# Patient Record
Sex: FEMALE | Race: WHITE
[De-identification: names, ages, dates, MRNs, and addresses within clinical notes are randomized per-mention and may not be internally consistent; named-entity substitution may affect disease eponyms.]

---

## 2020-08-29 ENCOUNTER — HOSPITAL ENCOUNTER (OUTPATIENT)
Age: 60
End: 2020-08-29
Payer: COMMERCIAL

## 2020-08-29 DIAGNOSIS — N30.00: Primary | ICD-10-CM

## 2020-08-29 PROCEDURE — 87086 URINE CULTURE/COLONY COUNT: CPT

## 2021-03-11 ENCOUNTER — HOSPITAL ENCOUNTER (OUTPATIENT)
Age: 61
End: 2021-03-11
Payer: COMMERCIAL

## 2021-03-11 DIAGNOSIS — E87.5: Primary | ICD-10-CM

## 2021-03-11 LAB — POTASSIUM: 3.8 MMOL/L (ref 3.5–5.1)

## 2021-03-11 PROCEDURE — 36415 COLL VENOUS BLD VENIPUNCTURE: CPT

## 2021-03-11 PROCEDURE — 84132 ASSAY OF SERUM POTASSIUM: CPT

## 2021-09-02 ENCOUNTER — OFFICE VISIT (OUTPATIENT)
Dept: OBSTETRICS AND GYNECOLOGY | Facility: CLINIC | Age: 61
End: 2021-09-02

## 2021-09-02 VITALS
WEIGHT: 146 LBS | BODY MASS INDEX: 23.46 KG/M2 | HEIGHT: 66 IN | SYSTOLIC BLOOD PRESSURE: 122 MMHG | DIASTOLIC BLOOD PRESSURE: 75 MMHG

## 2021-09-02 DIAGNOSIS — Z01.419 ENCOUNTER FOR ANNUAL ROUTINE GYNECOLOGICAL EXAMINATION: Primary | ICD-10-CM

## 2021-09-02 DIAGNOSIS — N81.4 CYSTOCELE WITH UTERINE PROLAPSE: ICD-10-CM

## 2021-09-02 PROCEDURE — 99386 PREV VISIT NEW AGE 40-64: CPT | Performed by: OBSTETRICS & GYNECOLOGY

## 2021-09-02 RX ORDER — LISINOPRIL AND HYDROCHLOROTHIAZIDE 12.5; 1 MG/1; MG/1
1 TABLET ORAL DAILY
COMMUNITY
Start: 2021-06-22

## 2021-09-02 RX ORDER — ATORVASTATIN CALCIUM 40 MG/1
40 TABLET, FILM COATED ORAL DAILY
COMMUNITY
Start: 2021-07-27

## 2021-09-02 NOTE — PROGRESS NOTES
GYN Annual Exam     CC - Here for annual exam.        HPI  Mandie Sheldon is a 60 y.o. female, , who presents for annual well woman exam.  She is postmenopausal.  Patient denies vaginal bleeding. ..  Patient reports problems with: hot flashes and prolapse bladder . There were no changes to her medical or surgical history since her last visit.. Partner Status: Marital Status: .  New Partners since last visit: no.      Patient states she has a bladder prolapse. Patient states the prolapse has been present since 2016. She states its only getting worse. Patient states she feels a if something is coming out. Urination is hard sometimes.       Additional OB/GYN History   Current contraception: contraceptive methods: Post menopausal status  Desires to: do not start contraception  On HRT? No  Last Pap :   Last Completed Pap Smear     This patient has no relevant Health Maintenance data.        History of abnormal Pap smear: no  Family history of uterine, colon, breast, or ovarian cancer: no  Performs monthly Self-Breast Exam: yes  Last mammogram:  2016  Last Completed Mammogram     This patient has no relevant Health Maintenance data.        Last colonoscopy: Never  Last Completed Colonoscopy     This patient has no relevant Health Maintenance data.        Last DEXA: None  Exercises Regularly: no  Feelings of Anxiety or Depression: no      Tobacco Usage?: No   OB History        2    Para   2    Term   2            AB        Living   2       SAB        TAB        Ectopic        Molar        Multiple        Live Births   2                Health Maintenance   Topic Date Due   • Annual Gynecologic Pelvic and Breast Exam  Never done   • MAMMOGRAM  Never done   • COLORECTAL CANCER SCREENING  Never done   • ANNUAL PHYSICAL  Never done   • TDAP/TD VACCINES (1 - Tdap) Never done   • ZOSTER VACCINE (1 of 2) Never done   • HEPATITIS C SCREENING  Never done   • PAP SMEAR  Never done   • LIPID PANEL  Never  "done   • INFLUENZA VACCINE  10/01/2021   • COVID-19 Vaccine  Completed   • Pneumococcal Vaccine 0-64  Aged Out       The additional following portions of the patient's history were reviewed and updated as appropriate: allergies, current medications, past family history, past medical history, past social history, past surgical history and problem list.    Review of Systems   Constitutional: Negative.    HENT: Negative.    Eyes: Negative.    Respiratory: Negative.    Cardiovascular: Negative.    Gastrointestinal: Negative.    Endocrine: Negative.    Genitourinary: Negative.    Musculoskeletal: Negative.    Skin: Negative.    Allergic/Immunologic: Negative.    Neurological: Negative.    Hematological: Negative.    Psychiatric/Behavioral: Negative.        I have reviewed and agree with the HPI, ROS, and historical information as entered above. Angie Stevenson MD    Objective   /75   Ht 167.6 cm (66\")   Wt 66.2 kg (146 lb)   LMP  (LMP Unknown)   BMI 23.57 kg/m²     Physical Exam  Vitals and nursing note reviewed. Exam conducted with a chaperone present.   Constitutional:       Appearance: She is well-developed.   HENT:      Head: Normocephalic and atraumatic.   Neck:      Thyroid: No thyroid mass or thyromegaly.   Cardiovascular:      Rate and Rhythm: Normal rate and regular rhythm.      Heart sounds: No murmur heard.     Pulmonary:      Effort: Pulmonary effort is normal. No retractions.      Breath sounds: Normal breath sounds. No wheezing, rhonchi or rales.   Chest:      Chest wall: No mass or tenderness.      Breasts:         Right: Normal. No mass, nipple discharge, skin change or tenderness.         Left: Normal. No mass, nipple discharge, skin change or tenderness.   Abdominal:      General: Bowel sounds are normal.      Palpations: Abdomen is soft. Abdomen is not rigid. There is no mass.      Tenderness: There is no abdominal tenderness. There is no guarding.      Hernia: No hernia is present. There is " no hernia in the left inguinal area.   Genitourinary:     Labia:         Right: No rash, tenderness or lesion.         Left: No rash, tenderness or lesion.       Vagina: Prolapsed vaginal walls present. No vaginal discharge or lesions.      Cervix: No cervical motion tenderness, discharge, lesion or cervical bleeding.      Uterus: Normal. Not enlarged, not fixed and not tender.       Adnexa:         Right: No mass or tenderness.          Left: No mass or tenderness.        Rectum: No external hemorrhoid.      Comments: Has small prolapsed bladder  Musculoskeletal:      Cervical back: Normal range of motion. No muscular tenderness.   Neurological:      Mental Status: She is alert and oriented to person, place, and time.   Psychiatric:         Behavior: Behavior normal.            Assessment and Plan    Problem List Items Addressed This Visit     None      Visit Diagnoses     Encounter for annual routine gynecological examination    -  Primary    Relevant Orders    Pap IG, Rfx HPV ASCU    Cystocele with uterine prolapse              1. GYN annual well woman exam.   2. Reviewed monthly self breast exams.  Instructed to call with lumps, pain, or breast discharge.  Yearly mammograms ordered.  3. Recommended use of Vitamin D and getting adequate calcium in her diet. (1500mg)  4. Other: 5.  Ring pessary fitted and she seems to like it so we can order it for her  5. No follow-ups on file.     Angie Stevenson MD  09/02/2021

## 2021-09-07 DIAGNOSIS — N81.9 FEMALE GENITAL PROLAPSE, UNSPECIFIED TYPE: Primary | ICD-10-CM

## 2021-09-16 ENCOUNTER — TELEPHONE (OUTPATIENT)
Dept: OBSTETRICS AND GYNECOLOGY | Facility: CLINIC | Age: 61
End: 2021-09-16

## 2021-09-24 ENCOUNTER — OFFICE VISIT (OUTPATIENT)
Dept: OBSTETRICS AND GYNECOLOGY | Facility: CLINIC | Age: 61
End: 2021-09-24

## 2021-09-24 VITALS
BODY MASS INDEX: 23.95 KG/M2 | HEIGHT: 66 IN | SYSTOLIC BLOOD PRESSURE: 102 MMHG | WEIGHT: 149 LBS | DIASTOLIC BLOOD PRESSURE: 70 MMHG

## 2021-09-24 DIAGNOSIS — N81.4 CYSTOCELE WITH PROLAPSE: Primary | ICD-10-CM

## 2021-09-24 PROCEDURE — 99212 OFFICE O/P EST SF 10 MIN: CPT | Performed by: OBSTETRICS & GYNECOLOGY

## 2021-09-24 NOTE — PROGRESS NOTES
Chief Complaint   Patient presents with   • Follow-up     pessary placement       Subjective   HPI  Mandie Sheldon is a 60 y.o. female, , who presents for placement of pessary. Patient in office 2021 for pelvic organ prolapse.         Her last LMP was No LMP recorded (lmp unknown). Patient is postmenopausal..      Additional OB/GYN History   Current contraception: contraceptive methods: Post menopausal status  Desires to: do not start contraception  Last Pap :   Last Completed Pap Smear     This patient has no relevant Health Maintenance data.        History of abnormal Pap smear: no  Last mammogram:   Last Completed Mammogram     This patient has no relevant Health Maintenance data.        Tobacco Usage?: No   OB History        2    Para   2    Term   2            AB        Living   2       SAB        TAB        Ectopic        Molar        Multiple        Live Births   2                Health Maintenance   Topic Date Due   • Annual Gynecologic Pelvic and Breast Exam  Never done   • MAMMOGRAM  Never done   • COLORECTAL CANCER SCREENING  Never done   • ANNUAL PHYSICAL  Never done   • TDAP/TD VACCINES (1 - Tdap) Never done   • ZOSTER VACCINE (1 of 2) Never done   • HEPATITIS C SCREENING  Never done   • PAP SMEAR  Never done   • LIPID PANEL  Never done   • INFLUENZA VACCINE  10/01/2021   • COVID-19 Vaccine  Completed   • Pneumococcal Vaccine 0-64  Aged Out       The additional following portions of the patient's history were reviewed and updated as appropriate: allergies and current medications.    Review of Systems   Constitutional: Negative.    HENT: Negative.    Respiratory: Negative.    Cardiovascular: Negative.    Gastrointestinal: Negative.    Musculoskeletal: Negative.    Skin: Negative.    Allergic/Immunologic: Negative.    Neurological: Negative.    Hematological: Negative.    Psychiatric/Behavioral: Negative.        I have reviewed and agree with the HPI, ROS, and historical  "information as entered above. Angie Stevenson MD    Objective   /70   Ht 167.6 cm (66\")   Wt 67.6 kg (149 lb)   LMP  (LMP Unknown)   BMI 24.05 kg/m²     Physical Exam  Vitals and nursing note reviewed. Exam conducted with a chaperone present.   Genitourinary:     Labia:         Right: No rash, tenderness or lesion.         Left: No rash, tenderness or lesion.       Vagina: Normal. No lesions.      Cervix: No cervical motion tenderness, discharge, lesion or cervical bleeding.      Uterus: Normal. Not enlarged, not fixed and not tender.       Adnexa:         Right: No mass or tenderness.          Left: No mass or tenderness.        Rectum: No external hemorrhoid.      Comments: Chaperone Present        Assessment/Plan     Assessment     Problem List Items Addressed This Visit     None      Visit Diagnoses     Cystocele with prolapse    -  Primary          1. Patient is here for placement of the #5 ring pessary with support.    Plan     No follow-ups on file.  1. We will place the ring pessary without difficulty and patient seemed to have no problem with it.  We counseled her about using it and she can take it out herself Viri and have her come back in 6 weeks for check.      Angie Stevenson MD  09/24/2021    "

## 2021-11-09 ENCOUNTER — OFFICE VISIT (OUTPATIENT)
Dept: OBSTETRICS AND GYNECOLOGY | Facility: CLINIC | Age: 61
End: 2021-11-09

## 2021-11-09 ENCOUNTER — TELEPHONE (OUTPATIENT)
Dept: OBSTETRICS AND GYNECOLOGY | Facility: CLINIC | Age: 61
End: 2021-11-09

## 2021-11-09 VITALS
DIASTOLIC BLOOD PRESSURE: 74 MMHG | WEIGHT: 147 LBS | HEIGHT: 66 IN | SYSTOLIC BLOOD PRESSURE: 112 MMHG | BODY MASS INDEX: 23.63 KG/M2

## 2021-11-09 DIAGNOSIS — Z01.419 PAP TEST, AS PART OF ROUTINE GYNECOLOGICAL EXAMINATION: Primary | ICD-10-CM

## 2021-11-09 DIAGNOSIS — N81.4 CYSTOCELE WITH PROLAPSE: ICD-10-CM

## 2021-11-09 DIAGNOSIS — Z78.0 MENOPAUSE: ICD-10-CM

## 2021-11-09 PROCEDURE — 99396 PREV VISIT EST AGE 40-64: CPT | Performed by: OBSTETRICS & GYNECOLOGY

## 2021-11-09 RX ORDER — PROGESTERONE 100 MG/1
100 CAPSULE ORAL DAILY
Qty: 30 CAPSULE | Refills: 11 | Status: SHIPPED | OUTPATIENT
Start: 2021-11-09 | End: 2021-11-19

## 2021-11-09 RX ORDER — ESTRADIOL 0.05 MG/D
1 FILM, EXTENDED RELEASE TRANSDERMAL WEEKLY
Qty: 8 PATCH | Refills: 11 | Status: SHIPPED | OUTPATIENT
Start: 2021-11-09 | End: 2022-09-28

## 2021-11-09 NOTE — TELEPHONE ENCOUNTER
Pt's pharmacy called and is needing some clarification on the perscription for an estradiol patch.

## 2021-11-09 NOTE — TELEPHONE ENCOUNTER
S/w pharmacy and they stated the estradiol patch Dr. Stevenson sent in is for apply one patch twice weekly. Needed to okay this.

## 2021-11-09 NOTE — PROGRESS NOTES
GYN Annual Exam     CC - Here for annual exam.        HPI  Mandie Sheldon is a 60 y.o. female, , who presents for annual well woman exam.  She is postmenopausal.  Patient denies vaginal bleeding. ..  Patient reports problems with: prolapse-pessary in place-asking to have checked and cleaned today. states has removed on her own x1 since placement 2021. There were no changes to her medical or surgical history since her last visit.. Partner Status: Marital Status: .  New Partners since last visit: no. Patient here to follow up after pessary placement. Asking to have annual exam with pap today as well as it has been several years since her last gyn exam.     Additional OB/GYN History   Current contraception: contraceptive methods: Post menopausal status  Desires to: do not start contraception  On HRT? No  Last Pap :   Last Completed Pap Smear     This patient has no relevant Health Maintenance data.        History of abnormal Pap smear: no  Family history of uterine, colon, breast, or ovarian cancer: no  Performs monthly Self-Breast Exam: yes  Last mammogram: several years ago. Done at unsure.requests order for mammogram today.     Last Completed Mammogram     This patient has no relevant Health Maintenance data.        Last colonoscopy:   Last Completed Colonoscopy     This patient has no relevant Health Maintenance data.        Last DEXA: None  Exercises Regularly: yes  Feelings of Anxiety or Depression: no      Tobacco Usage?: No   OB History        2    Para   2    Term   2            AB        Living   2       SAB        IAB        Ectopic        Molar        Multiple        Live Births   2                Health Maintenance   Topic Date Due   • Annual Gynecologic Pelvic and Breast Exam  Never done   • COLORECTAL CANCER SCREENING  Never done   • ANNUAL PHYSICAL  Never done   • TDAP/TD VACCINES (1 - Tdap) Never done   • ZOSTER VACCINE (1 of 2) Never done   • INFLUENZA VACCINE  Never  "done   • HEPATITIS C SCREENING  Never done   • PAP SMEAR  Never done   • LIPID PANEL  Never done   • MAMMOGRAM  Never done   • COVID-19 Vaccine  Completed   • Pneumococcal Vaccine 0-64  Aged Out       The additional following portions of the patient's history were reviewed and updated as appropriate: allergies, current medications, past family history, past medical history, past social history and past surgical history.    Review of Systems   Constitutional: Negative.    HENT: Negative.    Eyes: Negative.    Respiratory: Negative.    Cardiovascular: Negative.    Gastrointestinal: Negative.    Endocrine: Negative.    Musculoskeletal: Negative.    Skin: Negative.    Allergic/Immunologic: Negative.    Neurological: Negative.    Hematological: Negative.    Psychiatric/Behavioral: Negative.        I have reviewed and agree with the HPI, ROS, and historical information as entered above. Angie Stevenson MD    Objective   /74   Ht 167.6 cm (66\")   Wt 66.7 kg (147 lb)   LMP  (LMP Unknown)   BMI 23.73 kg/m²     Physical Exam  Vitals and nursing note reviewed. Exam conducted with a chaperone present.   Constitutional:       Appearance: She is well-developed.   HENT:      Head: Normocephalic and atraumatic.   Neck:      Thyroid: No thyroid mass or thyromegaly.   Cardiovascular:      Rate and Rhythm: Normal rate and regular rhythm.      Heart sounds: No murmur heard.      Pulmonary:      Effort: Pulmonary effort is normal. No retractions.      Breath sounds: Normal breath sounds. No wheezing, rhonchi or rales.   Chest:      Chest wall: No mass or tenderness.   Breasts:      Right: Normal. No mass, nipple discharge, skin change or tenderness.      Left: Normal. No mass, nipple discharge, skin change or tenderness.       Abdominal:      General: Bowel sounds are normal.      Palpations: Abdomen is soft. Abdomen is not rigid. There is no mass.      Tenderness: There is no abdominal tenderness. There is no guarding.      " Hernia: No hernia is present. There is no hernia in the left inguinal area.   Genitourinary:     Labia:         Right: No rash, tenderness or lesion.         Left: No rash, tenderness or lesion.       Vagina: Prolapsed vaginal walls present. No vaginal discharge or lesions.      Cervix: No cervical motion tenderness, discharge, lesion or cervical bleeding.      Uterus: Normal. Not enlarged, not fixed and not tender.       Adnexa:         Right: No mass or tenderness.          Left: No mass or tenderness.        Rectum: No external hemorrhoid.      Comments: Pessary in place and remopved and cleaned and replaced  Musculoskeletal:      Cervical back: Normal range of motion. No muscular tenderness.   Neurological:      Mental Status: She is alert and oriented to person, place, and time.   Psychiatric:         Behavior: Behavior normal.            Assessment and Plan    Problem List Items Addressed This Visit     None      Visit Diagnoses     Pap test, as part of routine gynecological examination    -  Primary    Relevant Orders    Pap IG, Rfx HPV ASCU    Mammo Screening Digital Tomosynthesis Bilateral With CAD    Menopause        Relevant Medications    estradiol (MINIVELLE, VIVELLE-DOT) 0.05 MG/24HR patch    Progesterone (Prometrium) 100 MG capsule    Cystocele with prolapse          pessary in place     1. GYN annual well woman exam.   2. Reviewed monthly self breast exams.  Instructed to call with lumps, pain, or breast discharge.  Yearly mammograms ordered.  3. Ordered mammogram today.  4. Osteoporosis screening ordered today.  5. Reviewed exercise as a preventative health measures.   6. Reviewed BMI and weight loss as preventative health measures.   7. Colonoscopy recommended.  8. Reviewed risks of ERT including increased risk of breast cancer, increased blood clots, increased heart disease.  Patient strongly desires to stay on or start ERT.  She understands we will use the lowest amount that adequately controls  her symptoms.  9. RTC in 1 year or PRN with problems.  10. Other: pt wants to start on hormones  11. Return in about 2 months (around 1/9/2022).     Angie Stevenson MD  11/09/2021

## 2021-11-11 ENCOUNTER — TELEPHONE (OUTPATIENT)
Dept: OBSTETRICS AND GYNECOLOGY | Facility: CLINIC | Age: 61
End: 2021-11-11

## 2021-11-16 DIAGNOSIS — Z01.419 PAP TEST, AS PART OF ROUTINE GYNECOLOGICAL EXAMINATION: ICD-10-CM

## 2021-11-24 ENCOUNTER — HOSPITAL ENCOUNTER (OUTPATIENT)
Dept: MAMMOGRAPHY | Facility: HOSPITAL | Age: 61
Discharge: HOME | End: 2021-11-24

## 2021-11-24 ENCOUNTER — APPOINTMENT (OUTPATIENT)
Dept: OTHER | Facility: HOSPITAL | Age: 61
End: 2021-11-24

## 2021-11-24 DIAGNOSIS — Z01.419 PAP TEST, AS PART OF ROUTINE GYNECOLOGICAL EXAMINATION: ICD-10-CM

## 2022-01-13 ENCOUNTER — OFFICE VISIT (OUTPATIENT)
Dept: OBSTETRICS AND GYNECOLOGY | Facility: CLINIC | Age: 62
End: 2022-01-13

## 2022-01-13 VITALS
HEIGHT: 66 IN | BODY MASS INDEX: 25.23 KG/M2 | SYSTOLIC BLOOD PRESSURE: 118 MMHG | WEIGHT: 157 LBS | DIASTOLIC BLOOD PRESSURE: 80 MMHG

## 2022-01-13 DIAGNOSIS — Z78.0 MENOPAUSE: Primary | ICD-10-CM

## 2022-01-13 PROCEDURE — 99212 OFFICE O/P EST SF 10 MIN: CPT | Performed by: OBSTETRICS & GYNECOLOGY

## 2022-01-13 RX ORDER — PROGESTERONE 100 MG/1
100 CAPSULE ORAL DAILY
Qty: 30 CAPSULE | Refills: 11
Start: 2022-01-13 | End: 2022-09-28

## 2022-01-13 NOTE — PROGRESS NOTES
Chief Complaint   Patient presents with   • Follow-up     f/u since starting HRT       Subjective   HPI  Mandie Sheldon is a 61 y.o. female, , who presents for follow up after starting estrogen patch and progesterone-states feeling well-less fatigue.        Her last LMP was No LMP recorded (lmp unknown). Patient is postmenopausal..      Additional OB/GYN History   Current contraception: contraceptive methods: Post menopausal status  Desires to: do not start contraception  Last Pap :   Last Completed Pap Smear          Ordered - PAP SMEAR (Yearly) Ordered on 2021  SCANNED - PAP SMEAR              History of abnormal Pap smear: no  Last mammogram:   Last Completed Mammogram     This patient has no relevant Health Maintenance data.        Tobacco Usage?: No   OB History        2    Para   2    Term   2            AB        Living   2       SAB        IAB        Ectopic        Molar        Multiple        Live Births   2                Health Maintenance   Topic Date Due   • COLORECTAL CANCER SCREENING  Never done   • ANNUAL PHYSICAL  Never done   • COVID-19 Vaccine (1) Never done   • TDAP/TD VACCINES (1 - Tdap) Never done   • ZOSTER VACCINE (1 of 2) Never done   • INFLUENZA VACCINE  Never done   • HEPATITIS C SCREENING  Never done   • LIPID PANEL  Never done   • MAMMOGRAM  Never done   • PAP SMEAR  2022   • Annual Gynecologic Pelvic and Breast Exam  11/10/2022   • Pneumococcal Vaccine 0-64  Aged Out       The additional following portions of the patient's history were reviewed and updated as appropriate: allergies and current medications.    Review of Systems   Constitutional: Negative.    HENT: Negative.    Respiratory: Negative.    Cardiovascular: Negative.    Gastrointestinal: Negative.    Genitourinary: Negative.    Musculoskeletal: Negative.    Skin: Negative.    Allergic/Immunologic: Negative.    Neurological: Negative.    Hematological: Negative.   "  Psychiatric/Behavioral: Negative.        I have reviewed and agree with the HPI, ROS, and historical information as entered above. Angie Stevenson MD    Objective   /80   Ht 167.6 cm (66\")   Wt 71.2 kg (157 lb)   LMP  (LMP Unknown)   BMI 25.34 kg/m²     Physical Exam not done     Assessment/Plan     Assessment     Problem List Items Addressed This Visit     None      Visit Diagnoses     Menopause    -  Primary    Relevant Medications    Progesterone (Prometrium) 100 MG capsule          Doing well and we will stay on same  dose    Plan     No follow-ups on file.  1. Risk explained       Angie Stevenson MD  01/13/2022    "

## 2022-02-02 ENCOUNTER — HOSPITAL ENCOUNTER (OUTPATIENT)
Dept: MAMMOGRAPHY | Facility: HOSPITAL | Age: 62
Discharge: HOME | End: 2022-02-02
Admitting: OBSTETRICS & GYNECOLOGY

## 2022-02-02 PROCEDURE — 77063 BREAST TOMOSYNTHESIS BI: CPT

## 2022-02-02 PROCEDURE — 77067 SCR MAMMO BI INCL CAD: CPT

## 2022-02-02 PROCEDURE — 77067 SCR MAMMO BI INCL CAD: CPT | Performed by: RADIOLOGY

## 2022-02-02 PROCEDURE — 77063 BREAST TOMOSYNTHESIS BI: CPT | Performed by: RADIOLOGY

## 2022-09-28 ENCOUNTER — TELEPHONE (OUTPATIENT)
Dept: OBSTETRICS AND GYNECOLOGY | Facility: CLINIC | Age: 62
End: 2022-09-28

## 2022-09-28 DIAGNOSIS — Z78.0 MENOPAUSE: ICD-10-CM

## 2022-09-28 RX ORDER — PROGESTERONE 100 MG/1
CAPSULE ORAL
Qty: 30 CAPSULE | Refills: 4 | Status: SHIPPED | OUTPATIENT
Start: 2022-09-28 | End: 2022-11-10 | Stop reason: SDUPTHER

## 2022-09-28 RX ORDER — ESTRADIOL 0.05 MG/D
FILM, EXTENDED RELEASE TRANSDERMAL
Qty: 8 PATCH | Refills: 0 | Status: SHIPPED | OUTPATIENT
Start: 2022-09-28 | End: 2022-10-12

## 2022-09-28 NOTE — TELEPHONE ENCOUNTER
S/w patient- patient states that she is taking the Progesterone daily.     S/w [facility] pharmacy- informed them that the progesterone is to be taken daily. Rx change has been made.

## 2022-09-28 NOTE — TELEPHONE ENCOUNTER
Rosita from [facility] pharmacy calling with question/clarification about Directions and quantity of rx for Progesterone

## 2022-10-11 DIAGNOSIS — Z78.0 MENOPAUSE: ICD-10-CM

## 2022-10-12 RX ORDER — ESTRADIOL 0.05 MG/D
FILM, EXTENDED RELEASE TRANSDERMAL
Qty: 8 PATCH | Refills: 0 | Status: SHIPPED | OUTPATIENT
Start: 2022-10-12 | End: 2022-11-10

## 2022-10-12 NOTE — TELEPHONE ENCOUNTER
Upcoming appointment is on 11/10/2022. Sending in 1 month supply. Patient will need to come to appointment to receive further refills.

## 2022-11-10 ENCOUNTER — OFFICE VISIT (OUTPATIENT)
Dept: OBSTETRICS AND GYNECOLOGY | Facility: CLINIC | Age: 62
End: 2022-11-10

## 2022-11-10 VITALS — WEIGHT: 133 LBS | BODY MASS INDEX: 21.47 KG/M2 | SYSTOLIC BLOOD PRESSURE: 108 MMHG | DIASTOLIC BLOOD PRESSURE: 62 MMHG

## 2022-11-10 DIAGNOSIS — Z01.419 PAP TEST, AS PART OF ROUTINE GYNECOLOGICAL EXAMINATION: Primary | ICD-10-CM

## 2022-11-10 DIAGNOSIS — N81.4 UTERINE PROLAPSE: ICD-10-CM

## 2022-11-10 DIAGNOSIS — Z78.0 MENOPAUSE: ICD-10-CM

## 2022-11-10 PROCEDURE — 57160 INSERT PESSARY/OTHER DEVICE: CPT | Performed by: OBSTETRICS & GYNECOLOGY

## 2022-11-10 PROCEDURE — 99396 PREV VISIT EST AGE 40-64: CPT | Performed by: OBSTETRICS & GYNECOLOGY

## 2022-11-10 RX ORDER — PROGESTERONE 100 MG/1
100 CAPSULE ORAL DAILY
Qty: 90 CAPSULE | Refills: 4 | Status: SHIPPED | OUTPATIENT
Start: 2022-11-10

## 2022-11-10 RX ORDER — ESTRADIOL 0.1 MG/D
1 FILM, EXTENDED RELEASE TRANSDERMAL 2 TIMES WEEKLY
Qty: 24 PATCH | Refills: 3 | Status: SHIPPED | OUTPATIENT
Start: 2022-11-10

## 2022-11-10 NOTE — PROGRESS NOTES
Gynecologic Annual Exam Note        GYN Annual Exam     CC - Here for annual exam.        HPI  Mandie Sheldon is a 61 y.o. female, , who presents for annual well woman exam as a established patient.  She is postmenopausal. Denies vaginal bleeding.  Patient reports problems with: none. There were no changes to her medical or surgical history since her last visit.. Partner Status: Marital Status: .  She is is sexually active. She has not had new partners.. STD testing recommendations have been explained to the patient and she does not desire STD testing.    Additional OB/GYN History   On HRT? Yes. Details: estradiol     Last Pap : 21. Results: negative. HPV: not done  Last Completed Pap Smear     This patient has no relevant Health Maintenance data.        History of abnormal Pap smear: no  Family history of uterine, colon, breast, or ovarian cancer: no  Performs monthly Self-Breast Exam: yes  Last mammogram: 22. Done at .    Last Completed Mammogram          MAMMOGRAM (Yearly) Next due on 2022  Mammo Screening Digital Tomosynthesis Bilateral With CAD              Last colonoscopy: has never had a colonoscopy.    Last Completed Colonoscopy     This patient has no relevant Health Maintenance data.            Last bone density scan (DEXA): None  Exercises Regularly: no  Feelings of Anxiety or Depression: no      Tobacco Usage?: No       Current Outpatient Medications:   •  atorvastatin (LIPITOR) 40 MG tablet, Take 40 mg by mouth Daily., Disp: , Rfl:   •  lisinopril-hydrochlorothiazide (PRINZIDE,ZESTORETIC) 10-12.5 MG per tablet, Take 1 tablet by mouth Daily., Disp: , Rfl:   •  Misc. Devices (Ring Pessary) misc, 1 Device Continuous., Disp: 1 each, Rfl: 0  •  Progesterone (PROMETRIUM) 100 MG capsule, Take 1 capsule by mouth Daily., Disp: 90 capsule, Rfl: 4  •  estradiol (VIVELLE-DOT) 0.1 MG/24HR patch, Place 1 patch on the skin as directed by provider 2 (Two) Times a  Week., Disp: 24 patch, Rfl: 3    Patient is requesting refills of progesterone and estradiol .    OB History        2    Para   2    Term   2            AB        Living   2       SAB        IAB        Ectopic        Molar        Multiple        Live Births   2                Past Medical History:   Diagnosis Date   • Hyperlipidemia    • Hypertension    • Multiple gestation    • Pelvic prolapse 2016   • Varicella     As a child        Past Surgical History:   Procedure Laterality Date   • APPENDECTOMY     • LASER ABLATION     • TUBAL ABDOMINAL LIGATION         Health Maintenance   Topic Date Due   • COLORECTAL CANCER SCREENING  Never done   • COVID-19 Vaccine (1) Never done   • ANNUAL PHYSICAL  Never done   • TDAP/TD VACCINES (1 - Tdap) Never done   • ZOSTER VACCINE (1 of 2) Never done   • HEPATITIS C SCREENING  Never done   • LIPID PANEL  Never done   • INFLUENZA VACCINE  Never done   • PAP SMEAR  2022   • Annual Gynecologic Pelvic and Breast Exam  11/10/2022   • MAMMOGRAM  2023   • Pneumococcal Vaccine 0-64  Aged Out       The additional following portions of the patient's history were reviewed and updated as appropriate: allergies, current medications, past family history, past medical history, past social history and past surgical history.    Review of Systems    I have reviewed and agree with the HPI, ROS, and historical information as entered above. Angie Stevenson MD    Objective   /62   Wt 60.3 kg (133 lb)   LMP  (LMP Unknown)   BMI 21.47 kg/m²     Physical Exam  Vitals and nursing note reviewed. Exam conducted with a chaperone present.   Constitutional:       Appearance: She is well-developed.   HENT:      Head: Normocephalic and atraumatic.   Neck:      Thyroid: No thyroid mass or thyromegaly.   Cardiovascular:      Rate and Rhythm: Normal rate and regular rhythm.      Heart sounds: No murmur heard.  Pulmonary:      Effort: Pulmonary effort is normal. No retractions.       Breath sounds: Normal breath sounds. No wheezing, rhonchi or rales.   Chest:      Chest wall: No mass or tenderness.   Breasts:     Right: Normal. No mass, nipple discharge, skin change or tenderness.      Left: Normal. No mass, nipple discharge, skin change or tenderness.   Abdominal:      General: Bowel sounds are normal.      Palpations: Abdomen is soft. Abdomen is not rigid. There is no mass.      Tenderness: There is no abdominal tenderness. There is no guarding.      Hernia: No hernia is present. There is no hernia in the left inguinal area.   Genitourinary:     Labia:         Right: No rash, tenderness or lesion.         Left: No rash, tenderness or lesion.       Vagina: Normal. No vaginal discharge or lesions.      Cervix: No cervical motion tenderness, discharge, lesion or cervical bleeding.      Uterus: Normal. Not enlarged, not fixed and not tender.       Adnexa:         Right: No mass or tenderness.          Left: No mass or tenderness.        Rectum: No external hemorrhoid.      Comments: Pessary present   Musculoskeletal:      Cervical back: Normal range of motion. No muscular tenderness.   Neurological:      Mental Status: She is alert and oriented to person, place, and time.   Psychiatric:         Behavior: Behavior normal.            Assessment and Plan    Problem List Items Addressed This Visit    None  Visit Diagnoses     Pap test, as part of routine gynecological examination    -  Primary    Relevant Orders    LIQUID-BASED PAP SMEAR, [facility] ([facility])    Uterine prolapse        Menopause        Relevant Medications    estradiol (VIVELLE-DOT) 0.1 MG/24HR patch    Progesterone (PROMETRIUM) 100 MG capsule      pessary removed cleaned and replaced     1. GYN annual well woman exam.   2. Reviewed monthly self breast exams.  Instructed to call with lumps, pain, or breast discharge.  Yearly mammograms ordered.  3. Ordered mammogram today.  4. Recommended use of Vitamin D and getting adequate  calcium in her diet. (1500mg)  5. Reviewed exercise as a preventative health measures.   6. Reviewed BMI and weight loss as preventative health measures.   7. Colonoscopy recommended.  8. Symptoms of menopausal transition reviewed with patient.  Reviewed risks/benefits of OTC, non-hormonal and hormonal treatment for vasomotor and other menopausal symptoms.  9. RTC in 1 year or PRN with problems.  10. Return in about 1 year (around 11/10/2023).     Angie Stevenson MD  11/10/2022

## 2022-11-14 LAB — REF LAB TEST METHOD: NORMAL

## 2023-01-17 ENCOUNTER — TRANSCRIBE ORDERS (OUTPATIENT)
Dept: ADMINISTRATIVE | Facility: HOSPITAL | Age: 63
End: 2023-01-17
Payer: COMMERCIAL

## 2023-01-17 DIAGNOSIS — Z12.31 VISIT FOR SCREENING MAMMOGRAM: Primary | ICD-10-CM

## 2023-02-09 ENCOUNTER — HOSPITAL ENCOUNTER (OUTPATIENT)
Dept: MAMMOGRAPHY | Facility: HOSPITAL | Age: 63
Discharge: HOME | End: 2023-02-09
Admitting: OBSTETRICS & GYNECOLOGY
Payer: COMMERCIAL

## 2023-02-09 DIAGNOSIS — Z12.31 VISIT FOR SCREENING MAMMOGRAM: ICD-10-CM

## 2023-02-09 PROCEDURE — 77063 BREAST TOMOSYNTHESIS BI: CPT

## 2023-02-09 PROCEDURE — 77067 SCR MAMMO BI INCL CAD: CPT

## 2023-02-09 PROCEDURE — 77067 SCR MAMMO BI INCL CAD: CPT | Performed by: RADIOLOGY

## 2023-02-09 PROCEDURE — 77063 BREAST TOMOSYNTHESIS BI: CPT | Performed by: RADIOLOGY

## 2023-09-09 DIAGNOSIS — Z78.0 MENOPAUSE: ICD-10-CM

## 2023-09-11 RX ORDER — ESTRADIOL 0.1 MG/D
FILM, EXTENDED RELEASE TRANSDERMAL
Qty: 24 PATCH | Refills: 0 | Status: SHIPPED | OUTPATIENT
Start: 2023-09-11

## 2023-11-16 ENCOUNTER — OFFICE VISIT (OUTPATIENT)
Dept: OBSTETRICS AND GYNECOLOGY | Facility: CLINIC | Age: 63
End: 2023-11-16
Payer: COMMERCIAL

## 2023-11-16 VITALS
BODY MASS INDEX: 19.73 KG/M2 | SYSTOLIC BLOOD PRESSURE: 120 MMHG | HEIGHT: 66 IN | DIASTOLIC BLOOD PRESSURE: 70 MMHG | WEIGHT: 122.8 LBS

## 2023-11-16 DIAGNOSIS — Z01.419 WOMEN'S ANNUAL ROUTINE GYNECOLOGICAL EXAMINATION: Primary | ICD-10-CM

## 2023-11-16 DIAGNOSIS — N81.4 UTERINE PROLAPSE: ICD-10-CM

## 2023-11-16 DIAGNOSIS — Z78.0 MENOPAUSE: ICD-10-CM

## 2023-11-16 PROCEDURE — 99396 PREV VISIT EST AGE 40-64: CPT | Performed by: OBSTETRICS & GYNECOLOGY

## 2023-11-16 RX ORDER — ESTRADIOL 0.1 MG/D
1 FILM, EXTENDED RELEASE TRANSDERMAL 2 TIMES WEEKLY
Qty: 24 PATCH | Refills: 3 | Status: SHIPPED | OUTPATIENT
Start: 2023-11-16

## 2023-11-16 RX ORDER — PROGESTERONE 100 MG/1
100 CAPSULE ORAL DAILY
Qty: 90 CAPSULE | Refills: 4 | Status: SHIPPED | OUTPATIENT
Start: 2023-11-16 | End: 2024-02-14

## 2023-11-16 RX ORDER — TRIAMCINOLONE ACETONIDE 1 MG/G
1 CREAM TOPICAL 2 TIMES DAILY
COMMUNITY
Start: 2023-08-15

## 2023-11-16 NOTE — PROGRESS NOTES
Gynecologic Annual Exam Note        GYN Annual Exam     CC - Here for annual exam.        HPI  Mandie Sheldon is a 62 y.o. female, , who presents for annual well woman exam as a established patient.  She is postmenopausal. Denies vaginal bleeding.  Patient reports problems with:  none . There were no changes to her medical or surgical history since her last visit.. Partner Status: Marital Status: .  She is is sexually active. She has not had new partners.. STD testing recommendations have been explained to the patient and she does not desire STD testing.    Additional OB/GYN History   On HRT? Yes. Details: Vivelle-Dot patch and Prometrium capsules      Last Pap : 11/10/2022. Results: negative. HPV: not done.   Last Completed Pap Smear       This patient has no relevant Health Maintenance data.          History of abnormal Pap smear: no  Family history of uterine, colon, breast, or ovarian cancer: no  Performs monthly Self-Breast Exam: no  Last mammogram: 2023. Done at [facility].    Last Completed Mammogram            MAMMOGRAM (Yearly) Next due on 2023  Mammo Screening Digital Tomosynthesis Bilateral With CAD    2022  Mammo Screening Digital Tomosynthesis Bilateral With CAD                  Last colonoscopy: has never had a colonoscopy.    Last Completed Colonoscopy       This patient has no relevant Health Maintenance data.              Last bone density scan (DEXA): None  Exercises Regularly: no  Feelings of Anxiety or Depression: no      Tobacco Usage?: No       Current Outpatient Medications:     atorvastatin (LIPITOR) 40 MG tablet, Take 1 tablet by mouth Daily., Disp: , Rfl:     estradiol (VIVELLE-DOT) 0.1 MG/24HR patch, Place 1 patch on the skin as directed by provider 2 (Two) Times a Week., Disp: 24 patch, Rfl: 3    lisinopril-hydrochlorothiazide (PRINZIDE,ZESTORETIC) 10-12.5 MG per tablet, Take 1 tablet by mouth Daily., Disp: , Rfl:     Misc. Devices  (Ring Pessary) misc, 1 Device Continuous., Disp: 1 each, Rfl: 0    Progesterone (PROMETRIUM) 100 MG capsule, Take 1 capsule by mouth Daily for 90 days., Disp: 90 capsule, Rfl: 4    triamcinolone (KENALOG) 0.1 % cream, Apply 1 application  topically to the appropriate area as directed 2 (Two) Times a Day., Disp: , Rfl:     Patient is requesting refills of Prometrium and Vivelle-Dot patch (3 months of supply).    OB History          2    Para   2    Term   2            AB        Living   2         SAB        IAB        Ectopic        Molar        Multiple        Live Births   2                Past Medical History:   Diagnosis Date    Hyperlipidemia     Hypertension     Multiple gestation     Pelvic prolapse 2016    Varicella     As a child        Past Surgical History:   Procedure Laterality Date    APPENDECTOMY      LASER ABLATION      TUBAL ABDOMINAL LIGATION         Health Maintenance   Topic Date Due    COLORECTAL CANCER SCREENING  Never done    TDAP/TD VACCINES (1 - Tdap) Never done    ZOSTER VACCINE (1 of 2) Never done    HEPATITIS C SCREENING  Never done    ANNUAL PHYSICAL  Never done    LIPID PANEL  Never done    INFLUENZA VACCINE  Never done    COVID-19 Vaccine (2023- season) 2023    PAP SMEAR  11/10/2023    Annual Gynecologic Pelvic and Breast Exam  2023    MAMMOGRAM  2024    Pneumococcal Vaccine 0-64  Aged Out       The additional following portions of the patient's history were reviewed and updated as appropriate: allergies, current medications, past family history, past medical history, past social history, past surgical history, and problem list.    Review of Systems   Constitutional: Negative.    HENT: Negative.     Eyes: Negative.    Respiratory: Negative.     Cardiovascular: Negative.    Gastrointestinal: Negative.    Endocrine: Negative.    Genitourinary: Negative.    Musculoskeletal: Negative.    Skin: Negative.    Allergic/Immunologic: Negative.   "  Neurological: Negative.    Hematological: Negative.    Psychiatric/Behavioral: Negative.         I have reviewed and agree with the HPI, ROS, and historical information as entered above. Angie Stevenson MD      Objective   /70   Ht 167.6 cm (66\")   Wt 55.7 kg (122 lb 12.8 oz)   LMP  (LMP Unknown)   BMI 19.82 kg/m²     Physical Exam  Vitals and nursing note reviewed. Exam conducted with a chaperone present.   Constitutional:       Appearance: She is well-developed.   HENT:      Head: Normocephalic and atraumatic.   Neck:      Thyroid: No thyroid mass or thyromegaly.   Cardiovascular:      Rate and Rhythm: Normal rate and regular rhythm.      Heart sounds: No murmur heard.  Pulmonary:      Effort: Pulmonary effort is normal. No retractions.      Breath sounds: Normal breath sounds. No wheezing, rhonchi or rales.   Chest:      Chest wall: No mass or tenderness.   Breasts:     Right: Normal. No mass, nipple discharge, skin change or tenderness.      Left: Normal. No mass, nipple discharge, skin change or tenderness.   Abdominal:      General: Bowel sounds are normal.      Palpations: Abdomen is soft. Abdomen is not rigid. There is no mass.      Tenderness: There is no abdominal tenderness. There is no guarding.      Hernia: No hernia is present. There is no hernia in the left inguinal area.   Genitourinary:     Labia:         Right: No rash, tenderness or lesion.         Left: No rash, tenderness or lesion.       Vagina: Normal. No vaginal discharge or lesions.      Cervix: No cervical motion tenderness, discharge, lesion or cervical bleeding.      Uterus: Normal. Not enlarged, not fixed and not tender.       Adnexa:         Right: No mass or tenderness.          Left: No mass or tenderness.        Rectum: No external hemorrhoid.      Comments: Pessary present   Musculoskeletal:      Cervical back: Normal range of motion. No muscular tenderness.   Neurological:      Mental Status: She is alert and oriented to " person, place, and time.   Psychiatric:         Behavior: Behavior normal.            Assessment and Plan    Problem List Items Addressed This Visit    None  Visit Diagnoses       Women's annual routine gynecological examination    -  Primary    Relevant Orders    LIQUID-BASED PAP SMEAR WITH HPV GENOTYPING IF ASCUS ([facility])    Uterine prolapse        Menopause        Relevant Medications    estradiol (VIVELLE-DOT) 0.1 MG/24HR patch    Progesterone (PROMETRIUM) 100 MG capsule        Pessary removed cleaned and replaced --no irritation    GYN annual well woman exam.   Reviewed monthly self breast exams.  Instructed to call with lumps, pain, or breast discharge.  Yearly mammograms ordered.  Ordered mammogram today.  Recommended use of Vitamin D and getting adequate calcium in her diet. (1500mg)  Reviewed exercise as a preventative health measures.   Reviewed BMI and weight loss as preventative health measures.   Colonoscopy recommended.  RTC in 1 year or PRN with problems.  Return in about 1 year (around 11/16/2024) for Annual physical.     Angie Stevenson MD  11/16/2023

## 2023-11-20 LAB — REF LAB TEST METHOD: NORMAL

## 2024-01-10 ENCOUNTER — TRANSCRIBE ORDERS (OUTPATIENT)
Dept: ADMINISTRATIVE | Facility: HOSPITAL | Age: 64
End: 2024-01-10
Payer: COMMERCIAL

## 2024-01-10 DIAGNOSIS — Z12.31 VISIT FOR SCREENING MAMMOGRAM: Primary | ICD-10-CM

## 2024-02-12 ENCOUNTER — HOSPITAL ENCOUNTER (OUTPATIENT)
Dept: MAMMOGRAPHY | Facility: HOSPITAL | Age: 64
Discharge: HOME | End: 2024-02-12
Admitting: OBSTETRICS & GYNECOLOGY
Payer: COMMERCIAL

## 2024-02-12 DIAGNOSIS — Z12.31 VISIT FOR SCREENING MAMMOGRAM: ICD-10-CM

## 2024-02-12 PROCEDURE — 77063 BREAST TOMOSYNTHESIS BI: CPT

## 2024-02-12 PROCEDURE — 77067 SCR MAMMO BI INCL CAD: CPT

## 2024-02-13 PROCEDURE — 77063 BREAST TOMOSYNTHESIS BI: CPT | Performed by: RADIOLOGY

## 2024-02-13 PROCEDURE — 77067 SCR MAMMO BI INCL CAD: CPT | Performed by: RADIOLOGY

## 2024-11-19 ENCOUNTER — OFFICE VISIT (OUTPATIENT)
Dept: OBSTETRICS AND GYNECOLOGY | Facility: CLINIC | Age: 64
End: 2024-11-19
Payer: COMMERCIAL

## 2024-11-19 VITALS — SYSTOLIC BLOOD PRESSURE: 118 MMHG | WEIGHT: 133 LBS | BODY MASS INDEX: 21.47 KG/M2 | DIASTOLIC BLOOD PRESSURE: 70 MMHG

## 2024-11-19 DIAGNOSIS — Z01.419 PAP TEST, AS PART OF ROUTINE GYNECOLOGICAL EXAMINATION: Primary | ICD-10-CM

## 2024-11-19 DIAGNOSIS — Z01.419 WOMEN'S ANNUAL ROUTINE GYNECOLOGICAL EXAMINATION: ICD-10-CM

## 2024-11-19 DIAGNOSIS — Z78.0 MENOPAUSE: ICD-10-CM

## 2024-11-19 PROCEDURE — 99396 PREV VISIT EST AGE 40-64: CPT | Performed by: OBSTETRICS & GYNECOLOGY

## 2024-11-19 RX ORDER — PROGESTERONE 100 MG/1
CAPSULE ORAL
COMMUNITY
Start: 2021-11-10 | End: 2024-11-19 | Stop reason: SDUPTHER

## 2024-11-19 RX ORDER — PROGESTERONE 100 MG/1
100 CAPSULE ORAL DAILY
Qty: 90 CAPSULE | Refills: 4 | Status: SHIPPED | OUTPATIENT
Start: 2024-11-19

## 2024-11-19 RX ORDER — ESTRADIOL 0.1 MG/D
1 FILM, EXTENDED RELEASE TRANSDERMAL 2 TIMES WEEKLY
Qty: 24 PATCH | Refills: 4 | Status: SHIPPED | OUTPATIENT
Start: 2024-11-21

## 2024-11-19 NOTE — PROGRESS NOTES
Gynecologic Annual Exam Note        GYN Annual Exam     CC - Here for annual exam.        HPI  Mandie Sheldon is a 63 y.o. female, , who presents for annual well woman exam as a established patient.  She is postmenopausal.. Denies vaginal bleeding.   There were no changes to her medical or surgical history since her last visit. Marital Status: .  She is sexually active. She has not had new partners.. STD testing recommendations have been explained to the patient and she declines STD testing.    The patient would like to discuss the following complaints today: pessary check    Additional OB/GYN History   On HRT? Yes. Details: estradiol patch and progesterone    Last Pap : 23. Results: negative. HPV:  not done .   Last Completed Pap Smear       This patient has no relevant Health Maintenance data.          History of abnormal Pap smear: no  Family history of uterine, colon, breast, or ovarian cancer: no  Performs monthly Self-Breast Exam: yes  Last mammogram: 24. Done at . There is a copy in the chart.    Last Completed Mammogram            MAMMOGRAM (Every 2 Years) Next due on 2024  Mammo Screening Digital Tomosynthesis Bilateral With CAD    2023  Mammo Screening Digital Tomosynthesis Bilateral With CAD    2022  Mammo Screening Digital Tomosynthesis Bilateral With CAD                  Last colonoscopy: has never had a colonoscopy or cologuard    Last Completed Colonoscopy       This patient has no relevant Health Maintenance data.            She has never had a bone density scan  Exercises Regularly: yes  Feelings of Anxiety or Depression: no      Tobacco Usage?: No       Current Outpatient Medications:     atorvastatin (LIPITOR) 40 MG tablet, Take 1 tablet by mouth Daily., Disp: , Rfl:     [START ON 2024] estradiol (VIVELLE-DOT) 0.1 MG/24HR patch, Place 1 patch on the skin as directed by provider 2 (Two) Times a Week., Disp: 24 patch, Rfl:  4    lisinopril-hydrochlorothiazide (PRINZIDE,ZESTORETIC) 10-12.5 MG per tablet, Take 1 tablet by mouth Daily., Disp: , Rfl:     Misc. Devices (Ring Pessary) misc, 1 Device Continuous., Disp: 1 each, Rfl: 0    Progesterone (PROMETRIUM) 100 MG capsule, Take 1 capsule by mouth Daily., Disp: 90 capsule, Rfl: 4    triamcinolone (KENALOG) 0.1 % cream, Apply 1 Application topically to the appropriate area as directed 2 (Two) Times a Day., Disp: , Rfl:     Patient is requesting refills of HRT 90 day supply.    OB History          2    Para   2    Term   2            AB        Living   2         SAB        IAB        Ectopic        Molar        Multiple        Live Births   2                Past Medical History:   Diagnosis Date    Hyperlipidemia     Hypertension     Multiple gestation     Pelvic prolapse 2016    Varicella     As a child        Past Surgical History:   Procedure Laterality Date    APPENDECTOMY      LASER ABLATION      TUBAL ABDOMINAL LIGATION         Health Maintenance   Topic Date Due    LIPID PANEL  Never done    COLORECTAL CANCER SCREENING  Never done    TDAP/TD VACCINES (1 - Tdap) Never done    ZOSTER VACCINE (1 of 2) Never done    HEPATITIS C SCREENING  Never done    ANNUAL PHYSICAL  Never done    INFLUENZA VACCINE  2024    COVID-19 Vaccine (2024- season) 2024    PAP SMEAR  2024    Annual Gynecologic Pelvic and Breast Exam  2024    MAMMOGRAM  2026    Pneumococcal Vaccine 0-64  Aged Out       The additional following portions of the patient's history were reviewed and updated as appropriate: allergies, current medications, past family history, past medical history, past social history, and past surgical history.    Review of Systems    I have reviewed and agree with the HPI, ROS, and historical information as entered above. Angie Stevenson MD      Objective   /70   Wt 60.3 kg (133 lb)   LMP  (LMP Unknown)   BMI 21.47 kg/m²     Physical  Exam  Vitals and nursing note reviewed. Exam conducted with a chaperone present.   Constitutional:       Appearance: She is well-developed.   HENT:      Head: Normocephalic and atraumatic.   Neck:      Thyroid: No thyroid mass or thyromegaly.   Cardiovascular:      Rate and Rhythm: Normal rate and regular rhythm.      Heart sounds: No murmur heard.  Pulmonary:      Effort: Pulmonary effort is normal. No retractions.      Breath sounds: Normal breath sounds. No wheezing, rhonchi or rales.   Chest:      Chest wall: No mass or tenderness.   Breasts:     Right: Normal. No mass, nipple discharge, skin change or tenderness.      Left: Normal. No mass, nipple discharge, skin change or tenderness.   Abdominal:      General: Bowel sounds are normal.      Palpations: Abdomen is soft. Abdomen is not rigid. There is no mass.      Tenderness: There is no abdominal tenderness. There is no guarding.      Hernia: No hernia is present. There is no hernia in the left inguinal area.   Genitourinary:     Labia:         Right: No rash, tenderness or lesion.         Left: No rash, tenderness or lesion.       Vagina: Normal. Foreign body present. No vaginal discharge or lesions.      Cervix: No cervical motion tenderness, discharge, lesion or cervical bleeding.      Uterus: Normal. Not enlarged, not fixed and not tender.       Adnexa:         Right: No mass or tenderness.          Left: No mass or tenderness.        Rectum: No external hemorrhoid.      Comments: Pessary in place   Musculoskeletal:      Cervical back: Normal range of motion. No muscular tenderness.   Neurological:      Mental Status: She is alert and oriented to person, place, and time.   Psychiatric:         Behavior: Behavior normal.            Assessment and Plan    Problem List Items Addressed This Visit    None  Visit Diagnoses       Pap test, as part of routine gynecological examination    -  Primary    Relevant Orders    LIQUID-BASED PAP SMEAR WITH HPV GENOTYPING IF  ASCUS ([facility])    Women's annual routine gynecological examination        Relevant Orders    LIQUID-BASED PAP SMEAR WITH HPV GENOTYPING IF ASCUS ([facility])    Menopause        Relevant Medications    estradiol (VIVELLE-DOT) 0.1 MG/24HR patch (Start on 11/21/2024)    Progesterone (PROMETRIUM) 100 MG capsule          Pessary removed cleaned and re inserted    GYN annual well woman exam.   Reviewed monthly self breast exams.  Instructed to call with lumps, pain, or breast discharge.  Yearly mammograms ordered.  Ordered mammogram today.  Recommended use of Vitamin D and getting adequate calcium in her diet. (1500mg)  Reviewed exercise as a preventative health measures.   Colonoscopy recommended.  RTC in 1 year or PRN with problems.  Return in about 1 year (around 11/19/2025) for Annual physical.         Angie Stevenson MD  11/19/2024

## 2024-11-22 LAB — REF LAB TEST METHOD: NORMAL

## 2025-01-08 ENCOUNTER — TRANSCRIBE ORDERS (OUTPATIENT)
Dept: ADMINISTRATIVE | Facility: HOSPITAL | Age: 65
End: 2025-01-08
Payer: COMMERCIAL

## 2025-01-08 DIAGNOSIS — Z12.31 SCREENING MAMMOGRAM FOR BREAST CANCER: Primary | ICD-10-CM

## 2025-03-05 LAB
NCCN CRITERIA FLAG: NORMAL
TYRER CUZICK SCORE: 5.7

## 2025-03-20 ENCOUNTER — HOSPITAL ENCOUNTER (OUTPATIENT)
Dept: MAMMOGRAPHY | Facility: HOSPITAL | Age: 65
Discharge: HOME | End: 2025-03-20
Admitting: OBSTETRICS & GYNECOLOGY
Payer: COMMERCIAL

## 2025-03-20 DIAGNOSIS — Z12.31 SCREENING MAMMOGRAM FOR BREAST CANCER: ICD-10-CM

## 2025-03-20 PROCEDURE — 77067 SCR MAMMO BI INCL CAD: CPT

## 2025-03-20 PROCEDURE — 77063 BREAST TOMOSYNTHESIS BI: CPT
